# Patient Record
Sex: FEMALE | Race: WHITE
[De-identification: names, ages, dates, MRNs, and addresses within clinical notes are randomized per-mention and may not be internally consistent; named-entity substitution may affect disease eponyms.]

---

## 2017-12-26 ENCOUNTER — HOSPITAL ENCOUNTER (OUTPATIENT)
Dept: HOSPITAL 62 - RAD | Age: 37
End: 2017-12-26
Attending: OBSTETRICS & GYNECOLOGY
Payer: OTHER GOVERNMENT

## 2017-12-26 DIAGNOSIS — Z30.431: Primary | ICD-10-CM

## 2017-12-26 PROCEDURE — 72170 X-RAY EXAM OF PELVIS: CPT

## 2017-12-26 NOTE — RADIOLOGY REPORT (SQ)
EXAM DESCRIPTION:  PELVIS AP



COMPLETED DATE/TIME:  12/26/2017 2:44 pm



REASON FOR STUDY:  ENCOUNTER FOR ROUTINE CHECKING OF INTRAUTERINE CONTRACEP DEV Z30.431  ENCOUNTER FO
R ROUTINE CHECKING OF INTRAUTERINE CONTR



COMPARISON:  None.



NUMBER OF VIEWS:  One view



TECHNIQUE:  AP Pelvis



LIMITATIONS:  None.



FINDINGS:  MINERALIZATION: Normal.

HIPS: No acute fracture or dislocation. No worrisome bone lesions.

PELVIS AND SACRUM: No acute fracture or dislocation. No worrisome bone lesions.

PUBIS AND ISCHIUM: No acute fracture.

LOWER LUMBAR SPINE: No significant findings as visualized.

SOFT TISSUES: No findings.

OTHER: IUD is located centrally within the pelvis with horizontal lie.



IMPRESSION:  NO ACUTE OSSEOUS ABNORMALITY.

IUD VISUALIZED CENTRALLY WITHIN THE PELVIS WITH HORIZONTAL LIE WHICH MAY BE SECONDARY TO THE TILT OF 
THE UTERUS.  CONSIDER PELVIC ULTRASOUND FOR CONFIRMATION.



TECHNICAL DOCUMENTATION:  JOB ID:  [id number]

 2011 [employer]- All Rights Reserved

## 2017-12-28 ENCOUNTER — HOSPITAL ENCOUNTER (OUTPATIENT)
Dept: HOSPITAL 62 - OROUT | Age: 37
Discharge: HOME | End: 2017-12-28
Attending: OBSTETRICS & GYNECOLOGY
Payer: OTHER GOVERNMENT

## 2017-12-28 VITALS — SYSTOLIC BLOOD PRESSURE: 120 MMHG | DIASTOLIC BLOOD PRESSURE: 82 MMHG

## 2017-12-28 DIAGNOSIS — R01.1: ICD-10-CM

## 2017-12-28 DIAGNOSIS — Z30.432: Primary | ICD-10-CM

## 2017-12-28 LAB
APPEARANCE UR: (no result)
APTT PPP: (no result) S
BILIRUB UR QL STRIP: NEGATIVE
ERYTHROCYTE [DISTWIDTH] IN BLOOD BY AUTOMATED COUNT: 13.4 % (ref 11.5–14)
GLUCOSE UR STRIP-MCNC: NEGATIVE MG/DL
HCT VFR BLD CALC: 41.1 % (ref 36–47)
HGB BLD-MCNC: 13.9 G/DL (ref 12–15.5)
KETONES UR STRIP-MCNC: NEGATIVE MG/DL
MCH RBC QN AUTO: 30.6 PG (ref 27–33.4)
MCHC RBC AUTO-ENTMCNC: 33.8 G/DL (ref 32–36)
MCV RBC AUTO: 91 FL (ref 80–97)
NITRITE UR QL STRIP: NEGATIVE
PH UR STRIP: 5 [PH] (ref 5–9)
PLATELET # BLD: 340 10^3/UL (ref 150–450)
PROT UR STRIP-MCNC: NEGATIVE MG/DL
RBC # BLD AUTO: 4.54 10^6/UL (ref 3.72–5.28)
SP GR UR STRIP: 1.01
UROBILINOGEN UR-MCNC: NEGATIVE MG/DL (ref ?–2)
WBC # BLD AUTO: 8.3 10^3/UL (ref 4–10.5)

## 2017-12-28 PROCEDURE — 81001 URINALYSIS AUTO W/SCOPE: CPT

## 2017-12-28 PROCEDURE — 85027 COMPLETE CBC AUTOMATED: CPT

## 2017-12-28 PROCEDURE — 81025 URINE PREGNANCY TEST: CPT

## 2017-12-28 PROCEDURE — 36415 COLL VENOUS BLD VENIPUNCTURE: CPT

## 2017-12-28 PROCEDURE — 58555 HYSTEROSCOPY DX SEP PROC: CPT

## 2017-12-28 PROCEDURE — C1765 ADHESION BARRIER: HCPCS

## 2017-12-28 PROCEDURE — 49320 DIAG LAPARO SEPARATE PROC: CPT

## 2017-12-28 PROCEDURE — 0UJD8ZZ INSPECTION OF UTERUS AND CERVIX, VIA NATURAL OR ARTIFICIAL OPENING ENDOSCOPIC: ICD-10-PCS | Performed by: OBSTETRICS & GYNECOLOGY

## 2017-12-28 PROCEDURE — 58301 REMOVE INTRAUTERINE DEVICE: CPT

## 2017-12-28 PROCEDURE — 0WJJ4ZZ INSPECTION OF PELVIC CAVITY, PERCUTANEOUS ENDOSCOPIC APPROACH: ICD-10-PCS | Performed by: OBSTETRICS & GYNECOLOGY

## 2017-12-28 PROCEDURE — 0UPD4HZ REMOVAL OF CONTRACEPTIVE DEVICE FROM UTERUS AND CERVIX, PERCUTANEOUS ENDOSCOPIC APPROACH: ICD-10-PCS | Performed by: OBSTETRICS & GYNECOLOGY

## 2017-12-28 RX ADMIN — FENTANYL CITRATE ONE MCG: 50 INJECTION INTRAMUSCULAR; INTRAVENOUS at 11:40

## 2017-12-28 RX ADMIN — FENTANYL CITRATE ONE MCG: 50 INJECTION INTRAMUSCULAR; INTRAVENOUS at 11:34

## 2017-12-28 NOTE — OPERATIVE REPORT
Operative Report


DATE OF SURGERY: 12/28/17


PREOPERATIVE DIAGNOSIS: Pain after IUD placement


POSTOPERATIVE DIAGNOSIS: Same IUD found in the abdomen


OPERATION: Hysteroscopy followed by laparoscopy with removal of IUD


SURGEON: HILARIO LOCKE


ANESTHESIA: GA


TISSUE REMOVED OR ALTERED: IUD removed


COMPLICATIONS: 





None


ESTIMATED BLOOD LOSS: 20 cc


INTRAOPERATIVE FINDINGS: IUD present in the abdomen.  Perforation of the uterus 

at the fundus of the uterus.  No evidence of bowel injury.


PROCEDURE: 





Patient presented to the office 1 week after IUD placement.  She has pain.  The 

IUD string was not visible at the cervix.  Ultrasound did not definitively show 

it in the uterus.  X-ray shows IUD present in the pelvis.  We plan to proceed 

with a hysteroscopy and then laparoscopy if it is not found in the uterus.





The patient was taken the OR and placed in supine position.  General anesthesia 

was induced.  Her perineum vagina and abdomen were prepared and draped in 

sterile fashion.  Her bladder was drained with a red rubber catheter.  A 

weighted speculum was placed in the vagina and the anterior lip cervix was 

grasped with a tenaculum.  A gentle sound was done being careful not to disturb 

the IUD if it was present in the uterus.  The cervix was also gently dilated.  

This allowed a hysteroscope to be placed.  View with a hysteroscope was quite 

good and no IUD could be seen.  All instruments were removed at this point.





An incision was made at the umbilicus and the natural umbilical defect was 

identified and dilated with a Odilia clamp allowing a 5 mm port to be placed 

bluntly.  Laparoscopy confirmed appropriate placement.  The abdomen was 

insufflated with CO2 gas.  The IUD could be seen laying on top of the omentum 

just above the uterus.  A suprapubic incision was made and a port was placed 

under laparoscopic visualization.  A grasper was used to remove the IUD.  The 

 aspirator was then used to evacuate the uterine clot.  The 

perforation site could be seen in the fundus of the uterus.  There was no 

active bleeding.  Interceed was placed over the site in an attempt to avoid any 

adhesions.  The bowel was inspected and there was no evidence of injury to 

bowel or bowel contents in the pelvis.  The suprapubic port was removed.  The 

gas was allowed to escape and the umbilical port and scope were removed at the 

same time.  The fascia at the umbilicus was closed with a 2-0 Vicryl stitch and 

skin the skin at both sites closed with 4-0 undyed Vicryl stitch.  The patient 

was extubated in the OR and taken recovery in stable condition.  This is the 

end of dictation thank you.

## 2017-12-29 ENCOUNTER — HOSPITAL ENCOUNTER (EMERGENCY)
Dept: HOSPITAL 62 - ER | Age: 37
Discharge: HOME | End: 2017-12-29
Payer: OTHER GOVERNMENT

## 2017-12-29 VITALS — DIASTOLIC BLOOD PRESSURE: 72 MMHG | SYSTOLIC BLOOD PRESSURE: 133 MMHG

## 2017-12-29 DIAGNOSIS — R10.9: ICD-10-CM

## 2017-12-29 DIAGNOSIS — G89.18: Primary | ICD-10-CM

## 2017-12-29 DIAGNOSIS — R07.89: ICD-10-CM

## 2017-12-29 DIAGNOSIS — Z98.890: ICD-10-CM

## 2017-12-29 LAB
ADD MANUAL DIFF: NO
ALBUMIN SERPL-MCNC: 4.1 G/DL (ref 3.5–5)
ALP SERPL-CCNC: 88 U/L (ref 38–126)
ALT SERPL-CCNC: 28 U/L (ref 9–52)
ANION GAP SERPL CALC-SCNC: 11 MMOL/L (ref 5–19)
APPEARANCE UR: (no result)
APTT PPP: YELLOW S
AST SERPL-CCNC: 17 U/L (ref 14–36)
BASOPHILS # BLD AUTO: 0 10^3/UL (ref 0–0.2)
BASOPHILS NFR BLD AUTO: 0.2 % (ref 0–2)
BILIRUB DIRECT SERPL-MCNC: 0.2 MG/DL (ref 0–0.4)
BILIRUB SERPL-MCNC: 0.3 MG/DL (ref 0.2–1.3)
BILIRUB UR QL STRIP: NEGATIVE
BUN SERPL-MCNC: 8 MG/DL (ref 7–20)
CALCIUM: 9.6 MG/DL (ref 8.4–10.2)
CHLORIDE SERPL-SCNC: 105 MMOL/L (ref 98–107)
CO2 SERPL-SCNC: 26 MMOL/L (ref 22–30)
EOSINOPHIL # BLD AUTO: 0 10^3/UL (ref 0–0.6)
EOSINOPHIL NFR BLD AUTO: 0.3 % (ref 0–6)
ERYTHROCYTE [DISTWIDTH] IN BLOOD BY AUTOMATED COUNT: 13.3 % (ref 11.5–14)
GLUCOSE SERPL-MCNC: 81 MG/DL (ref 75–110)
GLUCOSE UR STRIP-MCNC: NEGATIVE MG/DL
HCT VFR BLD CALC: 39.3 % (ref 36–47)
HGB BLD-MCNC: 13.4 G/DL (ref 12–15.5)
INR PPP: 0.87
KETONES UR STRIP-MCNC: NEGATIVE MG/DL
LYMPHOCYTES # BLD AUTO: 1.7 10^3/UL (ref 0.5–4.7)
LYMPHOCYTES NFR BLD AUTO: 12.9 % (ref 13–45)
MCH RBC QN AUTO: 30.8 PG (ref 27–33.4)
MCHC RBC AUTO-ENTMCNC: 34.1 G/DL (ref 32–36)
MCV RBC AUTO: 91 FL (ref 80–97)
MONOCYTES # BLD AUTO: 1.1 10^3/UL (ref 0.1–1.4)
MONOCYTES NFR BLD AUTO: 8 % (ref 3–13)
NEUTROPHILS # BLD AUTO: 10.4 10^3/UL (ref 1.7–8.2)
NEUTS SEG NFR BLD AUTO: 78.6 % (ref 42–78)
NITRITE UR QL STRIP: NEGATIVE
PH UR STRIP: 6 [PH] (ref 5–9)
PLATELET # BLD: 378 10^3/UL (ref 150–450)
POTASSIUM SERPL-SCNC: 4.2 MMOL/L (ref 3.6–5)
PROT SERPL-MCNC: 7.1 G/DL (ref 6.3–8.2)
PROT UR STRIP-MCNC: NEGATIVE MG/DL
PROTHROMBIN TIME: 12.5 SEC (ref 11.4–15.4)
RBC # BLD AUTO: 4.34 10^6/UL (ref 3.72–5.28)
SODIUM SERPL-SCNC: 142.3 MMOL/L (ref 137–145)
SP GR UR STRIP: 1.01
TOTAL CELLS COUNTED % (AUTO): 100 %
UROBILINOGEN UR-MCNC: NEGATIVE MG/DL (ref ?–2)
WBC # BLD AUTO: 13.2 10^3/UL (ref 4–10.5)

## 2017-12-29 PROCEDURE — 87086 URINE CULTURE/COLONY COUNT: CPT

## 2017-12-29 PROCEDURE — 82962 GLUCOSE BLOOD TEST: CPT

## 2017-12-29 PROCEDURE — 87088 URINE BACTERIA CULTURE: CPT

## 2017-12-29 PROCEDURE — 84484 ASSAY OF TROPONIN QUANT: CPT

## 2017-12-29 PROCEDURE — 81001 URINALYSIS AUTO W/SCOPE: CPT

## 2017-12-29 PROCEDURE — 87040 BLOOD CULTURE FOR BACTERIA: CPT

## 2017-12-29 PROCEDURE — 85610 PROTHROMBIN TIME: CPT

## 2017-12-29 PROCEDURE — 85025 COMPLETE CBC W/AUTO DIFF WBC: CPT

## 2017-12-29 PROCEDURE — 71020: CPT

## 2017-12-29 PROCEDURE — 99284 EMERGENCY DEPT VISIT MOD MDM: CPT

## 2017-12-29 PROCEDURE — 36415 COLL VENOUS BLD VENIPUNCTURE: CPT

## 2017-12-29 PROCEDURE — 93010 ELECTROCARDIOGRAM REPORT: CPT

## 2017-12-29 PROCEDURE — 80053 COMPREHEN METABOLIC PANEL: CPT

## 2017-12-29 PROCEDURE — 93005 ELECTROCARDIOGRAM TRACING: CPT

## 2017-12-29 NOTE — ER DOCUMENT REPORT
ED Medical Screen (RME)





- General


Mode of Arrival: Ambulatory


Information source: Patient


TRAVEL OUTSIDE OF THE [place] IN LAST 30 DAYS: No





<ALO CHAMBERLAIN - Last Filed: 12/29/17 13:47>





<BEKA LIVE - Last Filed: 12/29/17 16:33>





- General


Chief Complaint: Breathing Difficulty


Stated Complaint: STOMACH PAIN


Time Seen by Provider: 12/29/17 12:36


Notes: 


Patient is a 37 year old female presenting to the emergency department 

complaining of chest tightness and abdominal pain onset last night. Patient 

states she recently had surgery to have her IUD removed. Patient states her 

abdominal pain is located at her surgical sites. 





 (ALO CHAMBERLAIN)





- Related Data


Allergies/Adverse Reactions: 


 





No Known Allergies Allergy (Verified 02/04/15 19:37)


 











Past Medical History





- General


Information source: Patient





- Social History


Frequency of alcohol use: None


Drug Abuse: None





- Immunizations


Hx Diphtheria, Pertussis, Tetanus Vaccination: No


History of Influenza Vaccine for 10/2017 - 3/2018 Season: No





<ALO CHAMBERLAIN - Last Filed: 12/29/17 13:47>





Review of Systems





- Review of Systems


Constitutional: No symptoms reported


EENT: No symptoms reported


Cardiovascular: See HPI, Chest pain


Respiratory: No symptoms reported


Gastrointestinal: See HPI, Abdominal pain


Genitourinary: No symptoms reported


Female Genitourinary: No symptoms reported


Musculoskeletal: No symptoms reported


Skin: No symptoms reported


Hematologic/Lymphatic: No symptoms reported


Neurological/Psychological: No symptoms reported


-: Yes All other systems reviewed and negative





<ALO CHAMBERLAIN - Last Filed: 12/29/17 13:47>





Physical Exam





- General


General appearance: Appears well, Alert


In distress: None





- HEENT


Head: Normocephalic, Atraumatic


Eyes: Normal


Conjunctiva: Normal





- Respiratory


Respiratory status: No respiratory distress


Chest status: Nontender


Breath sounds: Normal





- Cardiovascular


Rhythm: Regular


Heart sounds: Normal auscultation





- Extremities


General upper extremity: Normal ROM


General lower extremity: Normal ROM





- Neurological


Neuro grossly intact: Yes


Cognition: Normal


Orientation: AAOx4


[place] Coma Scale Eye Opening: Spontaneous


[place] Coma Scale Verbal: Oriented


[place] Coma Scale Motor: Obeys Commands


[place] Coma Scale Total: 15


Speech: Normal





- Psychological


Associated symptoms: Normal affect, Normal mood





- Skin


Skin Temperature: Warm


Skin Moisture: Dry


Skin Color: Normal





<BULMAROSHAEAYO - Last Filed: 12/29/17 13:47>





- Vital signs


Vitals: 


 











Temp Pulse Resp BP Pulse Ox


 


 99.1 F   64   16   133/72 H  96 


 


 12/29/17 12:28  12/29/17 12:28  12/29/17 12:28  12/29/17 12:28  12/29/17 12:28














Course





- Laboratory


Result Diagrams: 


 12/29/17 13:34





 12/29/17 13:34





<BULMAROSHAEAYO - Last Filed: 12/29/17 13:47>





- Laboratory


Result Diagrams: 


 12/29/17 13:34





 12/29/17 13:34





- Diagnostic Test


Radiology reviewed: Reports reviewed





<BEKA LIVE - Last Filed: 12/29/17 16:33>





- Re-evaluation


Re-evalutation: 





12/29/17 14:57


Patient with some air under her diaphragm which is likely left over from 

surgery yesterday.  Discussed with patient who also has referred pain into her 

shoulder.  She appears well and is eating and drinking.  No further abdominal 

pain.  No guarding or rebound.  Blood work within normal limits.  Urine 

consistent with recent operation.  Patient will be discharged home and is to 

follow-up with her doctor.  Understands and agrees with plan.





12/29/17 16:32


I personally performed the services described in the documentation, reviewed 

and edited the documentation which was dictated to the scribe in my presence, 

and it accurately records my words and actions.








 (BEKA LIVE)





- Vital Signs


Vital signs: 


 











Temp Pulse Resp BP Pulse Ox


 


 99.1 F   64   16   133/72 H  96 


 


 12/29/17 12:28  12/29/17 12:28  12/29/17 12:28  12/29/17 12:28  12/29/17 12:28














- Laboratory


Laboratory results interpreted by me: 


 











  12/29/17 12/29/17





  13:21 13:34


 


WBC   13.2 H


 


Seg Neutrophils %   78.6 H


 


Lymphocytes %   12.9 L


 


Absolute Neutrophils   10.4 H


 


Urine Blood  LARGE H 














Doctor's Discharge





<ALO CHAMBERLAIN - Last Filed: 12/29/17 13:47>





<BEKA LIVE - Last Filed: 12/29/17 16:33>





- Discharge


Clinical Impression: 


 Postoperative pain





Condition: Stable


Disposition: HOME, SELF-CARE


Additional Instructions: 


Please call your surgeon to let them know you were seen in the emergency 

department today.  It appears that your pain is from left over gas from the 

surgery.  If you develop a fever, trouble breathing, or any further concerns, 

please return to the emergency department immediately.


Forms:  Return to Work





Scribe Documentation





- Scribe


Written by Scribe:: Yung Rowell, 12/29/2017 13:00


acting as scribe for :: Patricia





<ALO CHAMBERLAIN - Last Filed: 12/29/17 13:47>

## 2017-12-31 NOTE — EKG REPORT
SEVERITY:- ABNORMAL ECG -

SINUS RHYTHM

BORDERLINE LEFT AXIS DEVIATION

NONSPECIFIC T ABNORMALITIES, INFERIOR LEADS

:

Confirmed by: Cecy Louie MD 31-Dec-2017 11:07:34

## 2018-12-13 ENCOUNTER — HOSPITAL ENCOUNTER (EMERGENCY)
Dept: HOSPITAL 62 - ER | Age: 38
Discharge: HOME | End: 2018-12-13
Payer: OTHER GOVERNMENT

## 2018-12-13 VITALS — SYSTOLIC BLOOD PRESSURE: 129 MMHG | DIASTOLIC BLOOD PRESSURE: 87 MMHG

## 2018-12-13 DIAGNOSIS — R51: ICD-10-CM
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
## 2018-12-13 DIAGNOSIS — S09.90XA: Primary | ICD-10-CM

## 2018-12-13 DIAGNOSIS — R11.0: ICD-10-CM

## 2018-12-13 DIAGNOSIS — R42: ICD-10-CM

## 2018-12-13 PROCEDURE — 99283 EMERGENCY DEPT VISIT LOW MDM: CPT

## 2018-12-13 NOTE — ER DOCUMENT REPORT
ED Head/Face/Scalp Injury





- General


Chief Complaint: Head Injury


Stated Complaint: HEAD INJURY


Time Seen by Provider: 12/13/18 11:41


Mode of Arrival: Ambulatory


Information source: Patient


Notes: 





38-year-old female presented to ED for complaint of head injury a week ago.  

She states she hit her head on a window blind stick last week.  She states she 

has had a headache with intermittent dizziness.  Patient is alert oriented 

respirations regular and unlabored speaking in full sentences and walks with a 

even steady gait.  She states she hit her head on Thursday and then yesterday 

redeveloped a headache.  States she has had some mild nausea but no vomiting.  

Patient states she has had intermittent dizziness but none at this time.  

Patient is alert and oriented respirations regular and unlabored speaking in 

full sentences walks with a even steady gait.  Patient's pupils are equal and 

reactive to light.


TRAVEL OUTSIDE OF THE [place] IN LAST 30 DAYS: No





- HPI


Patient complains to provider of: Contusion


Injury to: Head


Location of problem: Head


Occurred: Other


Where: Home - Last Thursday


Timing: Still present - Intermittent


Context: Other - Hit her head on a window blind stick last week


Loss consciousness: No loss of consciousness


Remembers: Injury, Coming to hospital





- Related Data


Allergies/Adverse Reactions: 


 





No Known Allergies Allergy (Verified 02/04/15 19:37)


 











Past Medical History





- General


Information source: Patient





- Social History


Smoking Status: Never Smoker


Chew tobacco use (# tins/day): No


Frequency of alcohol use: None


Drug Abuse: None


Occupation: Teacher


Family History: Reviewed & Not Pertinent


Patient has suicidal ideation: No


Patient has homicidal ideation: No





- Past Medical History


Cardiac Medical History: Reports: None


Pulmonary Medical History: Reports: None


EENT Medical History: Reports: None


Neurological Medical History: Reports: None


Endocrine Medical History: Reports: None


Renal/ Medical History: Reports: None


Malignancy Medical History: Reports: None


GI Medical History: Reports: None


Musculoskeletal Medical History: Reports None


Skin Medical History: Reports None


Psychiatric Medical History: Reports: None


Traumatic Medical History: Reports: None


Infectious Medical History: Reports: None


Past Surgical History: Reports: Hx Abdominal Surgery - lap for IUD retrieval





- Immunizations


Hx Diphtheria, Pertussis, Tetanus Vaccination: No





Review of Systems





- Review of Systems


Constitutional: No symptoms reported


EENT: No symptoms reported


Cardiovascular: Dizziness - Intermittent


Respiratory: No symptoms reported


Gastrointestinal: Nausea - Intermittent


Genitourinary: No symptoms reported


Female Genitourinary: No symptoms reported


Musculoskeletal: No symptoms reported


Skin: No symptoms reported


Hematologic/Lymphatic: No symptoms reported


Neurological/Psychological: Headaches


-: Yes All other systems reviewed and negative





Physical Exam





- Vital signs


Vitals: 


 











Temp Pulse Resp BP Pulse Ox


 


 98 F   67   18   132/93 H  99 


 


 12/13/18 11:01  12/13/18 11:01  12/13/18 11:01  12/13/18 11:01  12/13/18 11:01











Interpretation: Normal





- General


General appearance: Appears well, Alert





- HEENT


Head: Normocephalic, Atraumatic


Eyes: Normal


Pupils: PERRL


Ears: Normal


External canal: Normal


Tympanic membrane: Normal


Sinus: Normal


Nasal: Normal


Mouth/Lips: Normal


Mucous membranes: Normal


Pharynx: Normal


Neck: Normal





- Respiratory


Respiratory status: No respiratory distress


Chest status: Nontender


Breath sounds: Normal


Chest palpation: Normal





- Cardiovascular


Rhythm: Regular


Heart sounds: Normal auscultation


Murmur: No





- Abdominal


Inspection: Normal


Distension: No distension


Bowel sounds: Normal


Tenderness: Nontender


Organomegaly: No organomegaly





- Back


Back: Normal, Nontender





- Extremities


General upper extremity: Normal inspection, Nontender, Normal color, Normal ROM

, Normal temperature


General lower extremity: Normal inspection, Nontender, Normal color, Normal ROM

, Normal temperature, Normal weight bearing.  No: Hernesto's sign





- Neurological


Neuro grossly intact: Yes


Cognition: Normal


Orientation: AAOx4


[place] Coma Scale Eye Opening: Spontaneous


[place] Coma Scale Verbal: Oriented


[place] Coma Scale Motor: Obeys Commands


[place] Coma Scale Total: 15


Speech: Normal


Cranial nerves: Normal


Cerebellar coordination: Normal


Motor strength normal: LUE, RUE, LLE, RLE


Additional motor exam normals: Equal 


Babinski reflex: Normal (flexor plantar)


Sensory: Normal





- Psychological


Associated symptoms: Normal affect, Normal mood





- Skin


Skin Temperature: Warm


Skin Moisture: Dry


Skin Color: Normal





Course





- Re-evaluation


Re-evalutation: 





12/13/18 22:47


After performing a Medical Screening Examination, I estimate there is LOW risk 

for ACUTE GLAUCOMA, TEMPORAL ARTERITIS, MENINGITIS, INCRANIAL HEMORRHAGE, or 

ISCHEMIC STROKE thus I consider the discharge disposition reasonable.  I have 

reevaluated this patient multiple times and no significant life threatening 

changes are noted. The patient and I have discussed the diagnosis and risks, 

and we agree with discharging home with close follow-up with the understanding 

that symptoms and presentations can change. We also discussed returning to the 

Emergency Department immediately if new or worsening symptoms occur. We have 

discussed the symptoms which are most concerning (e.g., changing or worsening 

symptoms, new numbness or weakness, vomiting, fever) that necessitate immediate 

return.





- Vital Signs


Vital signs: 


 











Temp Pulse Resp BP Pulse Ox


 


 98 F   65   18   129/87 H  100 


 


 12/13/18 11:01  12/13/18 12:18  12/13/18 12:18  12/13/18 12:18  12/13/18 12:18














Discharge





- Discharge


Clinical Impression: 


 head injury week ago





Headache


Qualifiers:


 Headache type: unspecified Headache chronicity pattern: acute headache 

Intractability: not intractable Qualified Code(s): R51 - Headache





Condition: Stable


Disposition: HOME, SELF-CARE


Additional Instructions: 


HEAD INJURY PRECAUTIONS:


     At this point, there is no evidence that your head injury is serious.  

Observation is necessary, however.


     Take only clear liquids for the first few hours, unless told otherwise by 

the doctor.  If no pain medication was prescribed, you may take acetaminophen 

according to the directions on the bottle.  Do not take any medication that may 

alter your level of alertness (unless you've discussed it with the doctor first)

.


      Limit activity for the first 24 hours.  Bed rest is best.  During the 

first 24 hours, check to see approximately every two to three hours that the 

patient is easily arousable, responds normally, and can perform common tasks 

such as walking without difficulty.


      Contact your doctor or go to the hospital if any of the following things 

occur: Persistent vomiting, difficulty in arousing the patient, worsening or 

continued headache, or failure to improve as expected.  Head injuries can cause 

symptoms that persist for a few days or even a few weeks.





Concussion





     You have suffered a concussion -- a temporary loss of certain brain 

functions due to a mild brain injury.  The recovery is usually rapid and 

complete.  The temporary problems occurring with a concussion can include loss 

of consciousness, dizziness, nausea, vomiting, and confusion.


     Repeat concussions can cause brain damage. In the future, avoid activities 

that will cause a blow to your head. Wear a helmet for sports such as 

snowboarding, biking, or skating.


     It's important that someone be with you for the first 24 hours. During 

this time, do not exercise or drive a vehicle.  Do not take any pain medication 

stronger than acetaminophen unless prescribed by the physician.  Any 

significant changes should be reported immediately to the physician.  Signs of 

a problem may include:





     (1) Mental confusion


     (2) Incoordination or staggering


     (3) Repeated or forceful vomiting


     (4) Clear or bloody drainage from ear, mouth, or nose


     (5) Severe headache, not relieved by acetaminophen or prescribed pain 

medication


     (6) Failure to improve in 24 hours





USE OF TYLENOL (ACETAMINOPHEN):


     Acetaminophen may be taken for pain relief or fever control. It's much 

safer than aspirin, offering a wider range of "safe" dosages.  It is safe 

during pregnancy.  Some brand names are Tylenol, Panadol, Datril, Anacin 3, 

Tempra, and Liquiprin. Acetaminophen can be repeated every four hours.  The 

following are maximum recommended dosages:





WEIGHT         Dose             Drops                  Elixir        Chewable(

80mg)


(LBS.)                            drprs=droppers    tsp=teaspoon


6               40 mg            0.4 ml (1/2)


6-11            80 mg            0.8 ml (full)              tsp               

   1       tab


12-16         120 mg           1 1/2 drprs             3/4  tsp               1 

1/2  tabs


17-23         160 mg             2  drprs             1    tsp                 

  2       tabs


24-30         240 mg             3  drprs             1 1/2 tsp                

3       tabs


30-35         320 mg                                       2    tsp            

       4       tabs


36-41         360 mg                                       2 1/4   tsp         

     4 1/2 tabs


42-47         400 mg                                       2 1/2   tsp         

     5      tabs


48-53         480 mg                                       3    tsp            

       6      tabs


54-59         520 mg                                       3  1/4  tsp         

     6 1/2 tabs


60-64         560 mg                                       3  1/2  tsp         

     7      tabs 


65-70         600 mg                                       3  3/4  tsp         

     7 1/2 tabs


71-76         640 mg                                       4   tsp             

      8      tabs


77-82         720 mg                                       4 1/2   tsp         

    9      tabs


83-88         800 mg                                       5   tsp             

    10      tabs





>89 pounds or adults          650 mg to 900 mg





Acetaminophen can be repeated every four hours.  Maximum dose not to exceed 

4000 mg a day.





   These maximum recommended dosages are slightly higher than the dosages 

written on the product container, but these dosages are very safe and below the 

toxic dosage for acetaminophen.





Antinausea Medication





     You have been given a medication to suppress nausea and vomiting. This 

type of medication can be given as a shot, pill, or suppository. It will 

usually last for many hours.  Pills and shots usually last six to eight hours, 

suppositories last about 12 hours.  For the typical illness, only one or two 

doses of the medication may be necessary.


     Mild lightheadedness may occur.  This type of medicine can cause 

drowsiness.  Do not drive or operate dangerous machinery while under its 

influence.  Do not mix with alcohol.


     See your doctor at once if you have muscle spasms or tightness, or 

uncontrollable motions (particularly of the neck, mouth, or jaw). Persistent 

vomiting or severe lightheadedness should also be evaluated by the physician.





ICE PACKS:


     Apply ice packs frequently against the painful area.  Many different 

schedules are recommended, such as "20 minutes on, 20 minutes off" or "one hour 

ice, two hours rest."  If you need to work, you may need to go longer between 

ice treatments.  You should plan to have the area ice packed AT LEAST one 

fourth of the time.


     The ice should be applied over the wrap, tape, or splint, or over a layer 

of cloth -- not directly against the skin.  Some ice bags have a built-in cloth 

and can be put directly on the skin.








FOLLOW-UP CARE:


If you have been referred to a physician for follow-up care, call the physician

s office for an appointment as you were instructed or within the next two days.

  If you experience worsening or a significant change in your symptoms, notify 

the physician immediately or return to the Emergency Department at any time for 

re-evaluation.


Prescriptions: 


Ibuprofen [Motrin 800 mg Tablet] 800 mg PO Q8HP PRN #14 tab


 PRN Reason: 


Ondansetron [Zofran Odt 4 mg Tablet] 1 tab PO Q6H #15 tab.rapdis


Forms:  Elevated Blood Pressure, Return to Work


Referrals: 


JAQUELINE VOGT DO [Primary Care Provider] - Follow up in 3-5 days

## 2019-04-11 ENCOUNTER — HOSPITAL ENCOUNTER (EMERGENCY)
Dept: HOSPITAL 62 - ER | Age: 39
Discharge: HOME | End: 2019-04-11
Payer: OTHER GOVERNMENT

## 2019-04-11 VITALS — DIASTOLIC BLOOD PRESSURE: 92 MMHG | SYSTOLIC BLOOD PRESSURE: 134 MMHG

## 2019-04-11 DIAGNOSIS — M54.2: Primary | ICD-10-CM

## 2019-04-11 DIAGNOSIS — R11.0: ICD-10-CM

## 2019-04-11 DIAGNOSIS — R51: ICD-10-CM

## 2019-04-11 PROCEDURE — 99283 EMERGENCY DEPT VISIT LOW MDM: CPT

## 2019-04-11 NOTE — ER DOCUMENT REPORT
HPI





- HPI


Time Seen by Provider: 04/11/19 08:10


Pain Level: 4


Context: 





Patient is a 39-year-old female who presents to the emergency department with a 

chief complaint of right-sided neck pain and headache.  Her symptoms started 2 

days ago.  She states that every time she turns her head to the right, she feels

a sharp pain that shoots up to her head.  She has been taking ibuprofen and 

Tylenol for her pain.  Last night she took an Excedrin Migraine, with little 

relief.  She also complains of some nausea, but denies vomiting.  She has not 

seen her primary care provider in regards to this problem.  She does take 

Loestrin oxybutynin.  She takes Zyrtec for seasonal allergies.  Denies any 

fever, body aches, chills, chest pain, abdominal pain, or any other symptoms.





- EENT


EENT: DENIES: Sore Throat, Ear Pain, Eye problems





- NEURO


Neurology: REPORTS: Headache - Dull.  DENIES: Weakness, Vision blurred, 

Dizzinesss / Vertigo





- CARDIOVASCULAR


Cardiovascular: DENIES: Chest pain





- RESPIRATORY


Respiratory: DENIES: Trouble Breathing, Coughing





- GASTROINTESTINAL


Gastrointestinal: DENIES: Abdominal Pain, Black / Bloody Stools





- URINARY


Urinary: DENIES: Dysuria, Urgency, Frequency





- REPRODUCTIVE


Reproductive: DENIES: Pregnant:





- MUSCULOSKELETAL


Musculoskeletal: DENIES: Extremity pain





- DERM


Skin Color: Normal


Skin Problems: None





Past Medical History





- Social History


Smoking Status: Never Smoker


Family History: Reviewed & Not Pertinent


Patient has suicidal ideation: No


Patient has homicidal ideation: No





- Past Medical History


Cardiac Medical History: 


   Denies: Hx Coronary Artery Disease, Hx Heart Attack, Hx Hypertension


Pulmonary Medical History: 


   Denies: Hx Asthma, Hx Bronchitis, Hx COPD, Hx Pneumonia


Neurological Medical History: Denies: Hx Cerebrovascular Accident, Hx Seizures


Renal/ Medical History: Denies: Hx Peritoneal Dialysis


Musculoskeletal Medical History: Denies Hx Arthritis


Past Surgical History: Reports: Hx Abdominal Surgery - lap for IUD retrieval





- Immunizations


Hx Diphtheria, Pertussis, Tetanus Vaccination: No





Vertical Provider Document





- CONSTITUTIONAL


Agree With Documented VS: Yes


Exam Limitations: No Limitations


General Appearance: No Apparent Distress





- INFECTION CONTROL


TRAVEL OUTSIDE OF THE [place] IN LAST 30 DAYS: No





- HEENT


HEENT: Atraumatic, Normocephalic





- NECK


Neck: Normal Inspection, Supple





- RESPIRATORY


Respiratory: Breath Sounds Normal, No Respiratory Distress





- CARDIOVASCULAR


Cardiovascular: Regular Rate, Regular Rhythm


Pulses: Normal: Radial





- MUSCULOSKELETAL/EXTREMETIES


Musculoskeletal/Extremeties: FROM, Tender - Right side of neck.





- NEURO


Level of Consciousness: Awake, Alert, Appropriate





- DERM


Integumentary: Warm, Dry





Course





- Re-evaluation


Re-evalutation: 





04/11/19 08:22


Patient's neck pain is consistent with muscular pain.  She will be given 

Flexeril to help with her symptoms.  She will also take ibuprofen and Tylenol 

for her pain.  I do not suspect any life-threatening etiology at this time.  Do 

not suspect meningitis.  Verbal discharge instructions were given to the 

patient.  They verbalized understanding.  They are stable for discharge.








- Vital Signs


Vital signs: 


                                        











Temp Pulse Resp BP Pulse Ox


 


 98.3 F   70   16   134/92 H  96 


 


 04/11/19 07:09  04/11/19 07:09  04/11/19 07:09  04/11/19 07:09  04/11/19 07:09














Discharge





- Discharge


Clinical Impression: 


 Neck pain





Headache


Qualifiers:


 Headache type: unspecified Headache chronicity pattern: acute headache 

Intractability: not intractable Qualified Code(s): R51 - Headache





Condition: Stable


Disposition: HOME, SELF-CARE


Additional Instructions: 


You were seen today in the emergency department for neck and head pain.  Your 

pain is due to muscle pain in your neck.  Make sure you stretch your neck well. 

You have been given Flexeril, medication to help with your muscle tension.  You 

can take Tylenol 1000 mg and ibuprofen 600 mg every 6 hours as needed for your 

pain.  Do not exceed 4000 mg of Tylenol and 2400 mg of ibuprofen in a 24-hour 

period.  Please follow-up with your primary care provider in regards to this 

visit.


Prescriptions: 


Cyclobenzaprine HCl [Flexeril 10 mg Tablet] 10 mg PO TIDP PRN #15 tab


 PRN Reason: 


Referrals: 


JAQUELINE VOGT DO [Primary Care Provider] - Follow up as needed